# Patient Record
Sex: FEMALE | Race: WHITE | Employment: UNEMPLOYED | URBAN - METROPOLITAN AREA
[De-identification: names, ages, dates, MRNs, and addresses within clinical notes are randomized per-mention and may not be internally consistent; named-entity substitution may affect disease eponyms.]

---
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
## 2024-11-12 RX ORDER — LEVONORGESTREL AND ETHINYL ESTRADIOL 0.15-0.03
1 KIT ORAL DAILY
COMMUNITY
End: 2024-11-12 | Stop reason: SDUPTHER

## 2024-11-12 RX ORDER — LEVONORGESTREL AND ETHINYL ESTRADIOL 0.15-0.03
1 KIT ORAL DAILY
Qty: 1 PACKET | Refills: 5 | Status: SHIPPED | OUTPATIENT
Start: 2024-11-12

## 2024-11-14 NOTE — TELEPHONE ENCOUNTER
PT needs refill on birth control, she was established at [facility] and has an appointment for a new patient but will be out of her medication before then. [facility] in [place] please

## 2024-11-19 RX ORDER — LEVONORGESTREL AND ETHINYL ESTRADIOL 0.15-0.03
1 KIT ORAL DAILY
Qty: 1 PACKET | Refills: 5 | OUTPATIENT
Start: 2024-11-19

## 2024-12-04 SDOH — HEALTH STABILITY: PHYSICAL HEALTH: ON AVERAGE, HOW MANY MINUTES DO YOU ENGAGE IN EXERCISE AT THIS LEVEL?: 30 MIN

## 2024-12-04 SDOH — HEALTH STABILITY: PHYSICAL HEALTH: ON AVERAGE, HOW MANY DAYS PER WEEK DO YOU ENGAGE IN MODERATE TO STRENUOUS EXERCISE (LIKE A BRISK WALK)?: 3 DAYS

## 2024-12-05 ENCOUNTER — OFFICE VISIT (OUTPATIENT)
Age: 24
End: 2024-12-05
Payer: COMMERCIAL

## 2024-12-05 VITALS
BODY MASS INDEX: 22.66 KG/M2 | SYSTOLIC BLOOD PRESSURE: 112 MMHG | HEART RATE: 94 BPM | OXYGEN SATURATION: 99 % | DIASTOLIC BLOOD PRESSURE: 86 MMHG | HEIGHT: 65 IN | WEIGHT: 136 LBS | RESPIRATION RATE: 16 BRPM | TEMPERATURE: 98.2 F

## 2024-12-05 DIAGNOSIS — Z91.010 PEANUT ALLERGY: ICD-10-CM

## 2024-12-05 DIAGNOSIS — J30.2 SEASONAL ALLERGIC RHINITIS, UNSPECIFIED TRIGGER: ICD-10-CM

## 2024-12-05 DIAGNOSIS — F41.9 ANXIETY: ICD-10-CM

## 2024-12-05 DIAGNOSIS — K21.9 GASTROESOPHAGEAL REFLUX DISEASE WITHOUT ESOPHAGITIS: ICD-10-CM

## 2024-12-05 DIAGNOSIS — J45.20 MILD INTERMITTENT ASTHMA WITHOUT COMPLICATION: Primary | ICD-10-CM

## 2024-12-05 PROCEDURE — 4004F PT TOBACCO SCREEN RCVD TLK: CPT | Performed by: FAMILY MEDICINE

## 2024-12-05 PROCEDURE — G8420 CALC BMI NORM PARAMETERS: HCPCS | Performed by: FAMILY MEDICINE

## 2024-12-05 PROCEDURE — 99214 OFFICE O/P EST MOD 30 MIN: CPT | Performed by: FAMILY MEDICINE

## 2024-12-05 PROCEDURE — G8427 DOCREV CUR MEDS BY ELIG CLIN: HCPCS | Performed by: FAMILY MEDICINE

## 2024-12-05 PROCEDURE — G8484 FLU IMMUNIZE NO ADMIN: HCPCS | Performed by: FAMILY MEDICINE

## 2024-12-05 RX ORDER — BUPROPION HYDROCHLORIDE 150 MG/1
300 TABLET, EXTENDED RELEASE ORAL DAILY
Qty: 60 TABLET | Refills: 5 | Status: SHIPPED | OUTPATIENT
Start: 2024-12-05 | End: 2025-01-04

## 2024-12-05 RX ORDER — FEXOFENADINE HCL 180 MG/1
180 TABLET ORAL DAILY
Qty: 30 TABLET | Refills: 5 | Status: SHIPPED | OUTPATIENT
Start: 2024-12-05 | End: 2025-01-04

## 2024-12-05 RX ORDER — FEXOFENADINE HCL 180 MG/1
180 TABLET ORAL DAILY
COMMUNITY
End: 2024-12-05 | Stop reason: SDUPTHER

## 2024-12-05 RX ORDER — BUSPIRONE HYDROCHLORIDE 15 MG/1
15 TABLET ORAL DAILY
COMMUNITY

## 2024-12-05 RX ORDER — DICYCLOMINE HYDROCHLORIDE 10 MG/1
10 CAPSULE ORAL
COMMUNITY
End: 2024-12-05 | Stop reason: SDUPTHER

## 2024-12-05 RX ORDER — HYDROXYZINE PAMOATE 25 MG/1
25 CAPSULE ORAL 3 TIMES DAILY PRN
COMMUNITY

## 2024-12-05 RX ORDER — BUPROPION HYDROCHLORIDE 150 MG/1
300 TABLET, EXTENDED RELEASE ORAL DAILY
COMMUNITY
End: 2024-12-05 | Stop reason: SDUPTHER

## 2024-12-05 RX ORDER — DICYCLOMINE HYDROCHLORIDE 10 MG/1
10 CAPSULE ORAL 3 TIMES DAILY PRN
Qty: 90 CAPSULE | Refills: 5 | Status: SHIPPED | OUTPATIENT
Start: 2024-12-05 | End: 2025-01-04

## 2024-12-05 SDOH — ECONOMIC STABILITY: INCOME INSECURITY: HOW HARD IS IT FOR YOU TO PAY FOR THE VERY BASICS LIKE FOOD, HOUSING, MEDICAL CARE, AND HEATING?: NOT HARD AT ALL

## 2024-12-05 SDOH — ECONOMIC STABILITY: FOOD INSECURITY: WITHIN THE PAST 12 MONTHS, THE FOOD YOU BOUGHT JUST DIDN'T LAST AND YOU DIDN'T HAVE MONEY TO GET MORE.: NEVER TRUE

## 2024-12-05 SDOH — ECONOMIC STABILITY: FOOD INSECURITY: WITHIN THE PAST 12 MONTHS, YOU WORRIED THAT YOUR FOOD WOULD RUN OUT BEFORE YOU GOT MONEY TO BUY MORE.: NEVER TRUE

## 2024-12-05 ASSESSMENT — PATIENT HEALTH QUESTIONNAIRE - PHQ9
5. POOR APPETITE OR OVEREATING: NOT AT ALL
6. FEELING BAD ABOUT YOURSELF - OR THAT YOU ARE A FAILURE OR HAVE LET YOURSELF OR YOUR FAMILY DOWN: NOT AT ALL
7. TROUBLE CONCENTRATING ON THINGS, SUCH AS READING THE NEWSPAPER OR WATCHING TELEVISION: NOT AT ALL
SUM OF ALL RESPONSES TO PHQ QUESTIONS 1-9: 2
9. THOUGHTS THAT YOU WOULD BE BETTER OFF DEAD, OR OF HURTING YOURSELF: NOT AT ALL
2. FEELING DOWN, DEPRESSED OR HOPELESS: SEVERAL DAYS
1. LITTLE INTEREST OR PLEASURE IN DOING THINGS: SEVERAL DAYS
SUM OF ALL RESPONSES TO PHQ QUESTIONS 1-9: 2
8. MOVING OR SPEAKING SO SLOWLY THAT OTHER PEOPLE COULD HAVE NOTICED. OR THE OPPOSITE, BEING SO FIGETY OR RESTLESS THAT YOU HAVE BEEN MOVING AROUND A LOT MORE THAN USUAL: NOT AT ALL
4. FEELING TIRED OR HAVING LITTLE ENERGY: NOT AT ALL
3. TROUBLE FALLING OR STAYING ASLEEP: NOT AT ALL
SUM OF ALL RESPONSES TO PHQ QUESTIONS 1-9: 2
10. IF YOU CHECKED OFF ANY PROBLEMS, HOW DIFFICULT HAVE THESE PROBLEMS MADE IT FOR YOU TO DO YOUR WORK, TAKE CARE OF THINGS AT HOME, OR GET ALONG WITH OTHER PEOPLE: NOT DIFFICULT AT ALL
SUM OF ALL RESPONSES TO PHQ9 QUESTIONS 1 & 2: 2
SUM OF ALL RESPONSES TO PHQ QUESTIONS 1-9: 2

## 2024-12-05 NOTE — PROGRESS NOTES
[facility]  [facility]  [address]  [address]  [facility] [address]  Dept: [phone]     Date of Visit:  2024  Patient Name: Alba Sherman   Patient :  2000     Alba Sherman is a 24 y.o. female who presents today for an general visit to be evaluated for the following condition(s):  Chief Complaint   Patient presents with    New Patient       New patient coming from [facility], refills        SUBJECTIVE:  HPI   Current Outpatient Medications   Medication Sig Dispense Refill    busPIRone (BUSPAR) 15 MG tablet Take 15 mg by mouth daily      omeprazole (PRILOSEC) 20 MG delayed release capsule Take 1 capsule by mouth every morning (before breakfast)      hydrOXYzine pamoate (VISTARIL) 25 MG capsule Take 1 capsule by mouth 3 times daily as needed for Itching      fexofenadine (ALLEGRA) 180 MG tablet Take 1 tablet by mouth daily 30 tablet 5    dicyclomine (BENTYL) 10 MG capsule Take 1 capsule by mouth 3 times daily as needed (ABDOMINAL PAIN) 90 capsule 5    buPROPion (WELLBUTRIN SR) 150 MG extended release tablet Take 2 tablets by mouth daily 60 tablet 5    levonorgestrel-ethinyl estradiol (JOLESSA) 0.15-0.03 MG per tablet Take 1 tablet by mouth daily 1 packet 5     No current facility-administered medications for this visit.      Allergies   Allergen Reactions    Latex Dermatitis    Egg-Derived Products     Milk-Related Compounds     Other      Cat, dog, egg white, mold, grass, dust, trees, weeds        OBJECTIVE:  Patient Active Problem List   Diagnosis    Asthma    Conjunctivitis, allergic    Rhinitis    Peanut allergy    Anxiety     Past Medical History:   Diagnosis Date    Asthma      No past surgical history on file.   Social History     Socioeconomic History    Marital status: Single   Tobacco Use    Smoking status: Never     Passive exposure: Yes    Smokeless tobacco: Never    Tobacco comments:     Mom smokes outside   Vaping Use    Vaping status: Every Day

## 2024-12-05 NOTE — PROGRESS NOTES
[facility]  [facility]  [address]  [address]  [facility] [address]  Dept: [phone]     Date of Visit:  2024  Patient Name: Alba Sherman   Patient :  2000     Alba Sherman is a 24 y.o. female who presents today for an general visit to be evaluated for the following condition(s):  Chief Complaint   Patient presents with    New Patient       New patient coming from [facility], refills        SUBJECTIVE:  HPI   PT IS HERE FOR ESTABLISHING CARE FROM [place] AND SHE IS DOING OK ,SHE HAS NO CONCERN FEEL SEVERY GOOD,PT STILL TAKING MEDS SHE IS OK WITH THEM SHE FEELS BETTER TAKING THE WELLBUTRIN IN THE PM AND FEELS OK  Current Outpatient Medications   Medication Sig Dispense Refill    busPIRone (BUSPAR) 15 MG tablet Take 15 mg by mouth daily      omeprazole (PRILOSEC) 20 MG delayed release capsule Take 1 capsule by mouth every morning (before breakfast)      hydrOXYzine pamoate (VISTARIL) 25 MG capsule Take 1 capsule by mouth 3 times daily as needed for Itching      fexofenadine (ALLEGRA) 180 MG tablet Take 1 tablet by mouth daily 30 tablet 5    dicyclomine (BENTYL) 10 MG capsule Take 1 capsule by mouth 3 times daily as needed (ABDOMINAL PAIN) 90 capsule 5    buPROPion (WELLBUTRIN SR) 150 MG extended release tablet Take 2 tablets by mouth daily 60 tablet 5    levonorgestrel-ethinyl estradiol (JOLESSA) 0.15-0.03 MG per tablet Take 1 tablet by mouth daily 1 packet 5     No current facility-administered medications for this visit.      Allergies   Allergen Reactions    Latex Dermatitis    Egg-Derived Products     Milk-Related Compounds     Other      Cat, dog, egg white, mold, grass, dust, trees, weeds        OBJECTIVE:  Patient Active Problem List   Diagnosis    Asthma    Conjunctivitis, allergic    Rhinitis    Peanut allergy    Anxiety     Past Medical History:   Diagnosis Date    Asthma      No past surgical history on file.   Social History     Socioeconomic History

## 2025-06-23 RX ORDER — BUPROPION HYDROCHLORIDE 300 MG/1
300 TABLET ORAL EVERY MORNING
Qty: 30 TABLET | Refills: 5 | Status: SHIPPED | OUTPATIENT
Start: 2025-06-23

## 2025-06-23 RX ORDER — BUPROPION HYDROCHLORIDE 150 MG/1
300 TABLET, EXTENDED RELEASE ORAL DAILY
Qty: 60 TABLET | Refills: 5 | Status: SHIPPED | OUTPATIENT
Start: 2025-06-23 | End: 2025-06-23 | Stop reason: ALTCHOICE

## 2025-07-02 ENCOUNTER — TELEPHONE (OUTPATIENT)
Age: 25
End: 2025-07-02

## 2025-07-02 RX ORDER — BUPROPION HYDROCHLORIDE 150 MG/1
150 TABLET, EXTENDED RELEASE ORAL 2 TIMES DAILY
Qty: 60 TABLET | Refills: 1 | Status: SHIPPED | OUTPATIENT
Start: 2025-07-02

## 2025-07-02 NOTE — TELEPHONE ENCOUNTER
She was a new patient in December here and it looks like in the discontinued meds she was taking WELLBUTRIN  MG two tablets daily.     Patient notified.

## 2025-07-02 NOTE — TELEPHONE ENCOUNTER
Patient called in stating you adjusted her Wellbutrin from the standard release 150 mg twice daily to the extended release 300 mg once daily. She states she has been taking it for the past week and is doing poorly mentally. She states she would like to switch back to the standard release 150 mg twice daily. Please advise.

## 2025-07-02 NOTE — TELEPHONE ENCOUNTER
Her Wellbutrin 150 was listed as an XL XL is usually used once a day if she is on the SR that to twice a day but to ask her to come in for follow-up after starting her regular medicine to see how she is doing I do not have any visits in this office yet with her

## 2025-08-27 RX ORDER — BUPROPION HYDROCHLORIDE 150 MG/1
150 TABLET, EXTENDED RELEASE ORAL 2 TIMES DAILY
Qty: 60 TABLET | Refills: 5 | Status: SHIPPED | OUTPATIENT
Start: 2025-08-27 | End: 2025-08-29 | Stop reason: SDUPTHER

## 2025-08-27 RX ORDER — BUSPIRONE HYDROCHLORIDE 15 MG/1
15 TABLET ORAL DAILY
Qty: 30 TABLET | Refills: 5 | Status: SHIPPED | OUTPATIENT
Start: 2025-08-27

## 2025-08-27 RX ORDER — LEVONORGESTREL AND ETHINYL ESTRADIOL 0.15-0.03
1 KIT ORAL DAILY
Qty: 1 PACKET | Refills: 5 | Status: SHIPPED | OUTPATIENT
Start: 2025-08-27

## 2025-08-29 RX ORDER — BUPROPION HYDROCHLORIDE 150 MG/1
150 TABLET, EXTENDED RELEASE ORAL 2 TIMES DAILY
Qty: 60 TABLET | Refills: 2 | Status: SHIPPED | OUTPATIENT
Start: 2025-08-29 | End: 2025-09-28